# Patient Record
Sex: FEMALE | Race: WHITE | NOT HISPANIC OR LATINO | Employment: OTHER | URBAN - METROPOLITAN AREA
[De-identification: names, ages, dates, MRNs, and addresses within clinical notes are randomized per-mention and may not be internally consistent; named-entity substitution may affect disease eponyms.]

---

## 2022-11-11 ENCOUNTER — TRANSFERRED RECORDS (OUTPATIENT)
Dept: HEALTH INFORMATION MANAGEMENT | Facility: CLINIC | Age: 32
End: 2022-11-11

## 2023-03-31 ENCOUNTER — TRANSFERRED RECORDS (OUTPATIENT)
Dept: HEALTH INFORMATION MANAGEMENT | Facility: CLINIC | Age: 33
End: 2023-03-31

## 2023-06-21 PROBLEM — F90.9 ADHD (ATTENTION DEFICIT HYPERACTIVITY DISORDER): Status: ACTIVE | Noted: 2023-06-21

## 2023-06-21 PROBLEM — E06.3 HASHIMOTO'S DISEASE: Status: ACTIVE | Noted: 2023-06-21

## 2023-06-21 RX ORDER — LEVOTHYROXINE SODIUM 125 UG/1
125 TABLET ORAL DAILY
COMMUNITY
End: 2024-03-26

## 2023-06-21 NOTE — PATIENT INSTRUCTIONS
Send a [email or web address] message with your tetanus booster dates and hepatitis B vaccine.  Schedule lab only visit for blood work    Dr. Brush    Adult Attention Deficit Hyperactivity Disorder    [facility]  [address] , [address], [place], [place], [address]  [phone]  [email or web address]    [facility]  Several Locations  [phone])  [email or web address]    [facility]  [address]  [address]  [place] [address]  [phone]  [email or web address]    [facility]  Several Locations.  [phone]  [email or web address]    [facility]  [facility]  [address]  [place], [place] [address] [phone]  [email or web address]              Preventive Health Recommendations  Female Ages 26 - 39  Yearly exam:   See your health care provider every year in order to  Review health changes.   Discuss preventive care.    Review your medicines if you your doctor has prescribed any.    Until age 30: Get a Pap test every three years (more often if you have had an abnormal result).    After age 30: Talk to your doctor about whether you should have a Pap test every 3 years or have a Pap test with HPV screening every 5 years.   You do not need a Pap test if your uterus was removed (hysterectomy) and you have not had cancer.  You should be tested each year for STDs (sexually transmitted diseases), if you're at risk.   Talk to your provider about how often to have your cholesterol checked.  If you are at risk for diabetes, you should have a diabetes test (fasting glucose).  Shots: Get a flu shot each year. Get a tetanus shot every 10 years.   Nutrition:   Eat at least 5 servings of fruits and vegetables each day.  Eat whole-grain bread, whole-wheat pasta and brown rice instead of white grains and  rice.  Get adequate Calcium and Vitamin D.     Lifestyle  Exercise at least 150 minutes a week (30 minutes a day, 5 days of the week). This will help you control your weight and prevent disease.  Limit alcohol to one drink per day.  No smoking.   Wear sunscreen to prevent skin cancer.  See your dentist every six months for an exam and cleaning.

## 2023-06-28 ASSESSMENT — ENCOUNTER SYMPTOMS
ARTHRALGIAS: 1
FEVER: 0
SHORTNESS OF BREATH: 0
HEMATURIA: 0
ABDOMINAL PAIN: 0
WEAKNESS: 0
NERVOUS/ANXIOUS: 1
JOINT SWELLING: 1
SORE THROAT: 0
BREAST MASS: 0
FREQUENCY: 0
EYE PAIN: 0
COUGH: 0
NAUSEA: 0
DYSURIA: 0
DIARRHEA: 0
HEMATOCHEZIA: 0
CHILLS: 1
HEARTBURN: 0
MYALGIAS: 0
PALPITATIONS: 0
DIZZINESS: 0
HEADACHES: 0
PARESTHESIAS: 0
CONSTIPATION: 1

## 2023-07-03 ENCOUNTER — OFFICE VISIT (OUTPATIENT)
Dept: FAMILY MEDICINE | Facility: CLINIC | Age: 33
End: 2023-07-03
Payer: COMMERCIAL

## 2023-07-03 VITALS
HEIGHT: 65 IN | BODY MASS INDEX: 23.16 KG/M2 | DIASTOLIC BLOOD PRESSURE: 63 MMHG | RESPIRATION RATE: 15 BRPM | SYSTOLIC BLOOD PRESSURE: 93 MMHG | TEMPERATURE: 98.8 F | WEIGHT: 139 LBS | HEART RATE: 64 BPM | OXYGEN SATURATION: 97 %

## 2023-07-03 DIAGNOSIS — Z00.00 ROUTINE GENERAL MEDICAL EXAMINATION AT A HEALTH CARE FACILITY: Primary | ICD-10-CM

## 2023-07-03 DIAGNOSIS — Z13.220 SCREENING FOR LIPID DISORDERS: ICD-10-CM

## 2023-07-03 DIAGNOSIS — Z11.4 SCREENING FOR HIV (HUMAN IMMUNODEFICIENCY VIRUS): ICD-10-CM

## 2023-07-03 DIAGNOSIS — F90.9 ATTENTION DEFICIT HYPERACTIVITY DISORDER (ADHD), UNSPECIFIED ADHD TYPE: ICD-10-CM

## 2023-07-03 DIAGNOSIS — E06.3 HASHIMOTO'S DISEASE: ICD-10-CM

## 2023-07-03 DIAGNOSIS — Z11.59 NEED FOR HEPATITIS C SCREENING TEST: ICD-10-CM

## 2023-07-03 DIAGNOSIS — J30.81 CAT ALLERGIES: ICD-10-CM

## 2023-07-03 PROBLEM — M23.92 ARTICULAR CARTILAGE DISORDER OF LEFT KNEE: Status: ACTIVE | Noted: 2022-12-15

## 2023-07-03 PROCEDURE — 99385 PREV VISIT NEW AGE 18-39: CPT | Performed by: STUDENT IN AN ORGANIZED HEALTH CARE EDUCATION/TRAINING PROGRAM

## 2023-07-03 PROCEDURE — 99213 OFFICE O/P EST LOW 20 MIN: CPT | Mod: 25 | Performed by: STUDENT IN AN ORGANIZED HEALTH CARE EDUCATION/TRAINING PROGRAM

## 2023-07-03 ASSESSMENT — ENCOUNTER SYMPTOMS
BREAST MASS: 0
COUGH: 0
DYSURIA: 0
CHILLS: 1
WEAKNESS: 0
DIARRHEA: 0
FEVER: 0
ARTHRALGIAS: 1
NERVOUS/ANXIOUS: 1
SORE THROAT: 0
FREQUENCY: 0
HEARTBURN: 0
EYE PAIN: 0
DIZZINESS: 0
HEADACHES: 0
HEMATURIA: 0
MYALGIAS: 0
SHORTNESS OF BREATH: 0
CONSTIPATION: 1
PARESTHESIAS: 0
PALPITATIONS: 0
ABDOMINAL PAIN: 0
NAUSEA: 0
HEMATOCHEZIA: 0
JOINT SWELLING: 1

## 2023-07-03 ASSESSMENT — PAIN SCALES - GENERAL: PAINLEVEL: NO PAIN (0)

## 2023-07-03 NOTE — PROGRESS NOTES
SUBJECTIVE:   CC: Thelma is an 32 year old who presents for preventive health visit.       7/3/2023     2:58 PM   Additional Questions   Roomed by Brenda Moreno     Healthy Habits:     Getting at least 3 servings of Calcium per day:  Yes    Bi-annual eye exam:  NO    Dental care twice a year:  Yes    Sleep apnea or symptoms of sleep apnea:  Daytime drowsiness    Diet:  Gluten-free/reduced    Frequency of exercise:  4-5 days/week    Duration of exercise:  15-30 minutes    Taking medications regularly:  Yes    Medication side effects:  None    Additional concerns today:  Yes    Work-dietician  Diet-gluten free  Execise-limited 2/2 knee surgery, strength, walking  Fam hx ovarian, breast, colon ca    LMP:no concerns, light-med  Contraception: none  Pap/hpv-due 2025    Hx PCOS  Hashimotos  -on synthroid  -will refill once labs back    adhd  -previously seeing therapist  -had discussed stimulant vs zoloft in past  -introvert due to lotsof stimulation  -perfectionism, anxiety, no panic ac    TDAP up to date  Unsure about hep B    Today's PHQ-2 Score:       7/3/2023     2:37 PM   PHQ-2 ( 1999 [employer])   Q1: Little interest or pleasure in doing things 0   Q2: Feeling down, depressed or hopeless 0   PHQ-2 Score 0   Q1: Little interest or pleasure in doing things Not at all   Q2: Feeling down, depressed or hopeless Not at all   PHQ-2 Score 0     Have you ever done Advance Care Planning? (For example, a Health Directive, POLST, or a discussion with a medical provider or your loved ones about your wishes): No, advance care planning information given to patient to review.  Patient declined advance care planning discussion at this time.    Social History     Tobacco Use     Smoking status: Never     Smokeless tobacco: Never   Substance Use Topics     Alcohol use: Yes     Comment: once a week socially, if that           6/28/2023    11:37 AM   Alcohol Use   Prescreen: >3 drinks/day or >7 drinks/week? No          No data to display               Reviewed orders with patient.  Reviewed health maintenance and updated orders accordingly - Yes      Breast Cancer Screening:    FHS-7:       6/28/2023    11:44 AM   Breast CA Risk Assessment (FHS-7)   Did any of your first-degree relatives have breast or ovarian cancer? No   Did any of your relatives have bilateral breast cancer? Unknown   Did any man in your family have breast cancer? Unknown   Did any woman in your family have breast and ovarian cancer? Yes   Did any woman in your family have breast cancer before age 50 y? No   Do you have 2 or more relatives with breast and/or ovarian cancer? Unknown   Do you have 2 or more relatives with breast and/or bowel cancer? No       Pertinent mammograms are reviewed under the imaging tab.    History of abnormal Pap smear: NO - age 30- 65 PAP every 3 years recommended     Reviewed and updated as needed this visit by clinical staff   Tobacco  Allergies  Meds              Reviewed and updated as needed this visit by Provider                 Past Medical History:   Diagnosis Date     Arthritis 2022    osteoarthritis knee     Thyroid disease 2012; 2016    hypothyroidism; Hashimoto's      Past Surgical History:   Procedure Laterality Date     ARTHROSCOPY KNEE  07/2019    acl, meniscus     OB History   No obstetric history on file.       Review of Systems   Constitutional: Positive for chills. Negative for fever.   HENT: Positive for congestion. Negative for ear pain, hearing loss and sore throat.    Eyes: Negative for pain and visual disturbance.   Respiratory: Negative for cough and shortness of breath.    Cardiovascular: Negative for chest pain, palpitations and peripheral edema.   Gastrointestinal: Positive for constipation. Negative for abdominal pain, diarrhea, heartburn, hematochezia and nausea.   Breasts:  Positive for discharge. Negative for tenderness and breast mass.   Genitourinary: Positive for vaginal discharge. Negative for dysuria, frequency,  "genital sores, hematuria, pelvic pain, urgency and vaginal bleeding.   Musculoskeletal: Positive for arthralgias and joint swelling. Negative for myalgias.   Skin: Negative for rash.   Neurological: Negative for dizziness, weakness, headaches and paresthesias.   Psychiatric/Behavioral: Negative for mood changes. The patient is nervous/anxious.         OBJECTIVE:   BP 93/63 (BP Location: Right arm, Patient Position: Sitting, Cuff Size: Adult Regular)   Pulse 64   Temp 98.8  F (37.1  C) (Temporal)   Resp 15   Ht 1.651 m (5' 5\")   Wt 63 kg (139 lb)   LMP 06/12/2023 (Exact Date)   SpO2 97%   BMI 23.13 kg/m       Physical Exam  Constitutional:       General: She is not in acute distress.     Appearance: Normal appearance. She is well-developed. She is not ill-appearing.   HENT:      Head: Normocephalic and atraumatic.      Right Ear: Tympanic membrane, ear canal and external ear normal.      Left Ear: Tympanic membrane, ear canal and external ear normal.      Nose: Nose normal.      Mouth/Throat:      Pharynx: No oropharyngeal exudate.   Eyes:      Conjunctiva/sclera: Conjunctivae normal.      Pupils: Pupils are equal, round, and reactive to light.   Cardiovascular:      Rate and Rhythm: Normal rate and regular rhythm.      Heart sounds: Normal heart sounds. No murmur heard.  Pulmonary:      Effort: Pulmonary effort is normal.      Breath sounds: No wheezing or rales.   Chest:   Breasts:     Right: Normal.      Left: Normal.   Abdominal:      General: Bowel sounds are normal.      Palpations: Abdomen is soft.      Tenderness: There is no abdominal tenderness.   Musculoskeletal:      Cervical back: Normal range of motion and neck supple. No rigidity.   Lymphadenopathy:      Cervical: No cervical adenopathy.      Upper Body:      Right upper body: No supraclavicular, axillary or pectoral adenopathy.      Left upper body: No supraclavicular, axillary or pectoral adenopathy.   Skin:     General: Skin is warm and dry. "      Findings: No rash.   Neurological:      General: No focal deficit present.      Mental Status: She is alert and oriented to person, place, and time.   Psychiatric:         Mood and Affect: Mood normal.         Behavior: Behavior normal.         ASSESSMENT/PLAN:   Thelma was seen today for physical.    Routine general medical examination at a health care facility  -Vitals WNL  -Pap: due 2025  -Immunizations: will send Tigerlily message with hep B, TDAP dates, declines covid vaccine  -Labs: cbc, cmp, hiv, hep c, tsh, lipids  -Exercise: strength training, walking  -Diet: well balanced, gluten free    Screening for HIV (human immunodeficiency virus)  -     HIV Antigen Antibody Combo; Future    Need for hepatitis C screening test  -     Hepatitis C Screen Reflex to HCV RNA Quant and Genotype; Future    Screening for lipid disorders  -     Lipid panel; Future    Hashimoto's disease  -     TSH with free T4 reflex; Future    Attention deficit hyperactivity disorder (ADHD), unspecified ADHD type  Never been formally diagnosed. Provider ADHD evaluation options in AVS. Briefly discussed medication options including stimulants, non-stimulants, wellbutrin, anti-depressants. Working with therapist-going well.    Cat allergies   Requesting allergy referral to test for cat allergy. Already using antihistamine and flonase.  -     Adult Allergy/Asthma Referral; Future      COUNSELING:  Reviewed preventive health counseling, as reflected in patient instructions        She reports that she has never smoked. She has never used smokeless tobacco.      Kwesi Brush DO  [facility]

## 2023-07-14 ENCOUNTER — LAB (OUTPATIENT)
Dept: LAB | Facility: CLINIC | Age: 33
End: 2023-07-14
Payer: COMMERCIAL

## 2023-07-14 DIAGNOSIS — Z11.4 SCREENING FOR HIV (HUMAN IMMUNODEFICIENCY VIRUS): ICD-10-CM

## 2023-07-14 DIAGNOSIS — E06.3 HASHIMOTO'S DISEASE: ICD-10-CM

## 2023-07-14 DIAGNOSIS — Z13.220 SCREENING FOR LIPID DISORDERS: ICD-10-CM

## 2023-07-14 DIAGNOSIS — Z00.00 ROUTINE GENERAL MEDICAL EXAMINATION AT A HEALTH CARE FACILITY: ICD-10-CM

## 2023-07-14 DIAGNOSIS — Z11.59 NEED FOR HEPATITIS C SCREENING TEST: ICD-10-CM

## 2023-07-14 LAB
ALBUMIN SERPL BCG-MCNC: 4.6 G/DL (ref 3.5–5.2)
ALP SERPL-CCNC: 43 U/L (ref 35–104)
ALT SERPL W P-5'-P-CCNC: 6 U/L (ref 0–50)
ANION GAP SERPL CALCULATED.3IONS-SCNC: 12 MMOL/L (ref 7–15)
AST SERPL W P-5'-P-CCNC: 27 U/L (ref 0–45)
BILIRUB SERPL-MCNC: 0.4 MG/DL
BUN SERPL-MCNC: 17.1 MG/DL (ref 6–20)
CALCIUM SERPL-MCNC: 9.4 MG/DL (ref 8.6–10)
CHLORIDE SERPL-SCNC: 102 MMOL/L (ref 98–107)
CHOLEST SERPL-MCNC: 195 MG/DL
CREAT SERPL-MCNC: 0.82 MG/DL (ref 0.51–0.95)
DEPRECATED HCO3 PLAS-SCNC: 24 MMOL/L (ref 22–29)
ERYTHROCYTE [DISTWIDTH] IN BLOOD BY AUTOMATED COUNT: 11.9 % (ref 10–15)
GFR SERPL CREATININE-BSD FRML MDRD: >90 ML/MIN/1.73M2
GLUCOSE SERPL-MCNC: 82 MG/DL (ref 70–99)
HCT VFR BLD AUTO: 37.9 % (ref 35–47)
HCV AB SERPL QL IA: NONREACTIVE
HDLC SERPL-MCNC: 56 MG/DL
HGB BLD-MCNC: 12.5 G/DL (ref 11.7–15.7)
HIV 1+2 AB+HIV1 P24 AG SERPL QL IA: NONREACTIVE
LDLC SERPL CALC-MCNC: 129 MG/DL
MCH RBC QN AUTO: 31.3 PG (ref 26.5–33)
MCHC RBC AUTO-ENTMCNC: 33 G/DL (ref 31.5–36.5)
MCV RBC AUTO: 95 FL (ref 78–100)
NONHDLC SERPL-MCNC: 139 MG/DL
PLATELET # BLD AUTO: 214 10E3/UL (ref 150–450)
POTASSIUM SERPL-SCNC: 4.3 MMOL/L (ref 3.4–5.3)
PROT SERPL-MCNC: 7.6 G/DL (ref 6.4–8.3)
RBC # BLD AUTO: 4 10E6/UL (ref 3.8–5.2)
SODIUM SERPL-SCNC: 138 MMOL/L (ref 136–145)
TRIGL SERPL-MCNC: 51 MG/DL
TSH SERPL DL<=0.005 MIU/L-ACNC: 3.04 UIU/ML (ref 0.3–4.2)
WBC # BLD AUTO: 4.7 10E3/UL (ref 4–11)

## 2023-07-14 PROCEDURE — 36415 COLL VENOUS BLD VENIPUNCTURE: CPT

## 2023-07-14 PROCEDURE — 84443 ASSAY THYROID STIM HORMONE: CPT

## 2023-07-14 PROCEDURE — 80061 LIPID PANEL: CPT

## 2023-07-14 PROCEDURE — 85027 COMPLETE CBC AUTOMATED: CPT

## 2023-07-14 PROCEDURE — 87389 HIV-1 AG W/HIV-1&-2 AB AG IA: CPT

## 2023-07-14 PROCEDURE — 80053 COMPREHEN METABOLIC PANEL: CPT

## 2023-07-14 PROCEDURE — 86803 HEPATITIS C AB TEST: CPT

## 2023-10-04 ENCOUNTER — MYC MEDICAL ADVICE (OUTPATIENT)
Dept: FAMILY MEDICINE | Facility: CLINIC | Age: 33
End: 2023-10-04
Payer: COMMERCIAL

## 2023-11-02 ENCOUNTER — OFFICE VISIT (OUTPATIENT)
Dept: ALLERGY | Facility: CLINIC | Age: 33
End: 2023-11-02
Attending: STUDENT IN AN ORGANIZED HEALTH CARE EDUCATION/TRAINING PROGRAM
Payer: COMMERCIAL

## 2023-11-02 VITALS
WEIGHT: 142.6 LBS | BODY MASS INDEX: 23.73 KG/M2 | DIASTOLIC BLOOD PRESSURE: 71 MMHG | HEART RATE: 66 BPM | OXYGEN SATURATION: 100 % | SYSTOLIC BLOOD PRESSURE: 112 MMHG

## 2023-11-02 DIAGNOSIS — R21 RASH: ICD-10-CM

## 2023-11-02 DIAGNOSIS — J30.81 CAT ALLERGIES: ICD-10-CM

## 2023-11-02 DIAGNOSIS — R06.7 SNEEZING: ICD-10-CM

## 2023-11-02 DIAGNOSIS — R09.89 RUNNY NOSE: Primary | ICD-10-CM

## 2023-11-02 PROCEDURE — 99203 OFFICE O/P NEW LOW 30 MIN: CPT | Performed by: INTERNAL MEDICINE

## 2023-11-02 RX ORDER — FLUTICASONE PROPIONATE 50 MCG
1 SPRAY, SUSPENSION (ML) NASAL DAILY
Qty: 16 G | Refills: 4 | Status: SHIPPED | OUTPATIENT
Start: 2023-11-02 | End: 2023-12-20

## 2023-11-02 RX ORDER — AZELASTINE HCL 205.5 UG/1
2 SPRAY NASAL DAILY
Qty: 30 ML | Refills: 4 | Status: SHIPPED | OUTPATIENT
Start: 2023-11-02 | End: 2023-12-20

## 2023-11-02 ASSESSMENT — ENCOUNTER SYMPTOMS
EYE REDNESS: 0
HEADACHES: 0
WHEEZING: 0
SINUS PRESSURE: 0
SHORTNESS OF BREATH: 0
EYE DISCHARGE: 1
ADENOPATHY: 0
JOINT SWELLING: 0
RHINORRHEA: 1
EYE ITCHING: 1
FEVER: 0
VOMITING: 0
ACTIVITY CHANGE: 0
CHEST TIGHTNESS: 0
CHILLS: 0
ARTHRALGIAS: 0
DIARRHEA: 0
NAUSEA: 0
FACIAL SWELLING: 0
COUGH: 0
MYALGIAS: 0

## 2023-11-02 NOTE — PROGRESS NOTES
Thelma Escobar was seen in the Allergy Clinic at [facility].    Thelma Escobar is a 32 year old female being seen today at the request of Dr. Brush in regards to environmental allergy concerns.    For the last 7 years she has had a cat.  She is starting to have increased symptoms over the last several years of itchy nose and throat, sneezing, rhinorrhea, eye itching, and congestion.  The primary symptoms are the sneezing and the rhinorrhea.  These symptoms are all year-round and seem to be worse in the spring.  She clears her throat frequently in the morning.  She has tried multiple antihistamines including Allegra, Zyrtec and Claritin.  The Allegra is what she is currently using and does seem to provide benefit.  She uses Flonase as needed but only once a week or less.  The Flonase is very effective.  She does not use eyedrops rarely in the spring.    She also notices when she consumes dairy in the winter only she will develop some small bumps on her hand that are red and mildly painful.  Cheese and yogurt is typically well-tolerated the rest of the year.    Past Medical History:   Diagnosis Date    Arthritis 2022    osteoarthritis knee    Thyroid disease 2012; 2016    hypothyroidism; Hashimoto's     Family History   Problem Relation Age of Onset    Hyperlipidemia Mother     Osteoporosis Mother     Thyroid Disease Father      Past Surgical History:   Procedure Laterality Date    ARTHROSCOPY KNEE  07/2019    acl, meniscus       ENVIRONMENTAL HISTORY:   Pets inside the house include 1 cat(s).  Do you smoke cigarettes or other recreational drugs? No There is/are 0 smokers living in the house. The house does not have a damp basement.     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      Review of Systems   Constitutional:  Negative for activity change, chills and fever.   HENT:  Positive for congestion, rhinorrhea and sneezing.  Negative for dental problem, ear pain, facial swelling, nosebleeds, postnasal drip and sinus pressure.    Eyes:  Positive for discharge and itching. Negative for redness.   Respiratory:  Negative for cough, chest tightness, shortness of breath and wheezing.    Cardiovascular:  Negative for chest pain.   Gastrointestinal:  Negative for diarrhea, nausea and vomiting.   Musculoskeletal:  Negative for arthralgias, joint swelling and myalgias.   Skin:  Negative for rash.   Neurological:  Negative for headaches.   Hematological:  Negative for adenopathy.   Psychiatric/Behavioral:  Negative for behavioral problems and self-injury.          Current Outpatient Medications:     azelastine (ASTEPRO) 0.15 % nasal spray, Spray 2 sprays into both nostrils daily, Disp: 30 mL, Rfl: 4    fluticasone (FLONASE) 50 MCG/ACT nasal spray, Spray 1 spray into both nostrils daily, Disp: 16 g, Rfl: 4    levothyroxine (SYNTHROID/LEVOTHROID) 125 MCG tablet, Take 125 mcg by mouth daily, Disp: , Rfl:   No Known Allergies      EXAM:   /71   Pulse 66   Wt 64.7 kg (142 lb 9.6 oz)   SpO2 100%   BMI 23.73 kg/m      Physical Exam    Constitutional:       General: She is not in acute distress.     Appearance: Normal appearance. She is not ill-appearing.   HENT:      Head: Normocephalic and atraumatic.      Nose: Nose normal. No congestion or rhinorrhea.      Mouth/Throat:      Mouth: Mucous membranes are moist.      Pharynx: Oropharynx is clear. No posterior oropharyngeal erythema.   Eyes:      General:         Right eye: No discharge.         Left eye: No discharge.   Cardiovascular:      Rate and Rhythm: Normal rate and regular rhythm.      Heart sounds: Normal heart sounds.   Pulmonary:      Effort: Pulmonary effort is normal.      Breath sounds: Normal breath sounds. No wheezing or rhonchi.   Skin:     General: Skin is warm.      Findings: No erythema or rash.   Neurological:      General: No focal deficit present.      Mental Status: She  is alert. Mental status is at baseline.   Psychiatric:         Mood and Affect: Mood normal.         Behavior: Behavior normal.        ASSESSMENT/PLAN:  Thelma Escobar is a 32 year old female seen today for environmental allergy concerns as well as dairy concerns when consumed in the winter causing a rash on her hands.    Allegra 180 mg once to twice daily  Flonase Sensimist 1 spray each nostril once to twice daily (may combine with Astepro).  Or could use Astepro by itself.  Zaditor or Pataday eyedrops as needed  Allergy shots are a consideration  D-hist  We will check blood test for environmental allergies.  No testing necessary for dairy since she can consume this most of the year.    Follow-up in 1 month      Thank you for allowing me to participate in the care of Thelma Escobar.      I spent 35 minutes on the date of the encounter doing chart review, history and exam, documentation and further coordination as noted above exclusive of separately reported interpretations    Mario Sauer MD  Allergy/Immunology  [facility]

## 2023-11-02 NOTE — PATIENT INSTRUCTIONS
Allegra 180 mg once to twice daily  Flonase Sensimist 1 spray each nostril once to twice daily (may combine with Astepro).  Or could use Astepro by itself.  Zaditor or Pataday eyedrops as needed  Allergy shots are a consideration  D-hist        Allergy Staff Appt Hours Shot Hours Location       Physician   Mario Sauer MD      Support Staff   RAYMOND Casas, RN   Michel DENISE MA         Mondays Tuesdays Thursdays and Fridays:      [place] 7-5 Wednesdays         Close                Mondays, Tuesdays and Fridays:  7:20 - 3:40              [facility]  [address]  [place] [address]  Allergy appointment  line: [phone]    Pulmonary Function Scheduling:  [place]: [phone]

## 2023-11-02 NOTE — LETTER
11/2/2023         RE: Thelma Escobar  [address]  [facility] [address]        Dear Colleague,    Thank you for referring your patient, Thelma Escobar, to the [facility] [place]. Please see a copy of my visit note below.    Thelma Escobar was seen in the Allergy Clinic at [facility].    Thelma Escobar is a 32 year old female being seen today at the request of Dr. Brush in regards to environmental allergy concerns.    For the last 7 years she has had a cat.  She is starting to have increased symptoms over the last several years of itchy nose and throat, sneezing, rhinorrhea, eye itching, and congestion.  The primary symptoms are the sneezing and the rhinorrhea.  These symptoms are all year-round and seem to be worse in the spring.  She clears her throat frequently in the morning.  She has tried multiple antihistamines including Allegra, Zyrtec and Claritin.  The Allegra is what she is currently using and does seem to provide benefit.  She uses Flonase as needed but only once a week or less.  The Flonase is very effective.  She does not use eyedrops rarely in the spring.    She also notices when she consumes dairy in the winter only she will develop some small bumps on her hand that are red and mildly painful.  Cheese and yogurt is typically well-tolerated the rest of the year.    Past Medical History:   Diagnosis Date     Arthritis 2022    osteoarthritis knee     Thyroid disease 2012; 2016    hypothyroidism; Hashimoto's     Family History   Problem Relation Age of Onset     Hyperlipidemia Mother      Osteoporosis Mother      Thyroid Disease Father      Past Surgical History:   Procedure Laterality Date     ARTHROSCOPY KNEE  07/2019    acl, meniscus       ENVIRONMENTAL HISTORY:   Pets inside the house include 1 cat(s).  Do you smoke cigarettes or other recreational drugs? No There is/are 0 smokers living in the house. The house does not have a  damp basement.     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      Review of Systems   Constitutional:  Negative for activity change, chills and fever.   HENT:  Positive for congestion, rhinorrhea and sneezing. Negative for dental problem, ear pain, facial swelling, nosebleeds, postnasal drip and sinus pressure.    Eyes:  Positive for discharge and itching. Negative for redness.   Respiratory:  Negative for cough, chest tightness, shortness of breath and wheezing.    Cardiovascular:  Negative for chest pain.   Gastrointestinal:  Negative for diarrhea, nausea and vomiting.   Musculoskeletal:  Negative for arthralgias, joint swelling and myalgias.   Skin:  Negative for rash.   Neurological:  Negative for headaches.   Hematological:  Negative for adenopathy.   Psychiatric/Behavioral:  Negative for behavioral problems and self-injury.          Current Outpatient Medications:      azelastine (ASTEPRO) 0.15 % nasal spray, Spray 2 sprays into both nostrils daily, Disp: 30 mL, Rfl: 4     fluticasone (FLONASE) 50 MCG/ACT nasal spray, Spray 1 spray into both nostrils daily, Disp: 16 g, Rfl: 4     levothyroxine (SYNTHROID/LEVOTHROID) 125 MCG tablet, Take 125 mcg by mouth daily, Disp: , Rfl:   No Known Allergies      EXAM:   /71   Pulse 66   Wt 64.7 kg (142 lb 9.6 oz)   SpO2 100%   BMI 23.73 kg/m      Physical Exam    Constitutional:       General: She is not in acute distress.     Appearance: Normal appearance. She is not ill-appearing.   HENT:      Head: Normocephalic and atraumatic.      Nose: Nose normal. No congestion or rhinorrhea.      Mouth/Throat:      Mouth: Mucous membranes are moist.      Pharynx: Oropharynx is clear. No posterior oropharyngeal erythema.   Eyes:      General:         Right eye: No discharge.         Left eye: No discharge.   Cardiovascular:      Rate and Rhythm: Normal rate and regular rhythm.      Heart sounds: Normal heart sounds.    Pulmonary:      Effort: Pulmonary effort is normal.      Breath sounds: Normal breath sounds. No wheezing or rhonchi.   Skin:     General: Skin is warm.      Findings: No erythema or rash.   Neurological:      General: No focal deficit present.      Mental Status: She is alert. Mental status is at baseline.   Psychiatric:         Mood and Affect: Mood normal.         Behavior: Behavior normal.        ASSESSMENT/PLAN:  Thelma Escobar is a 32 year old female seen today for environmental allergy concerns as well as dairy concerns when consumed in the winter causing a rash on her hands.    Allegra 180 mg once to twice daily  Flonase Sensimist 1 spray each nostril once to twice daily (may combine with Astepro).  Or could use Astepro by itself.  Zaditor or Pataday eyedrops as needed  Allergy shots are a consideration  D-hist  We will check blood test for environmental allergies.  No testing necessary for dairy since she can consume this most of the year.    Follow-up in 1 month      Thank you for allowing me to participate in the care of Thelma Escobar.      I spent 35 minutes on the date of the encounter doing chart review, history and exam, documentation and further coordination as noted above exclusive of separately reported interpretations    Mario Sauer MD  Allergy/Immunology  [facility]      Again, thank you for allowing me to participate in the care of your patient.        Sincerely,        Mario Sauer MD

## 2023-11-17 ENCOUNTER — LAB (OUTPATIENT)
Dept: LAB | Facility: CLINIC | Age: 33
End: 2023-11-17
Payer: COMMERCIAL

## 2023-11-17 DIAGNOSIS — R06.7 SNEEZING: ICD-10-CM

## 2023-11-17 DIAGNOSIS — R09.89 RUNNY NOSE: ICD-10-CM

## 2023-11-17 PROCEDURE — 36415 COLL VENOUS BLD VENIPUNCTURE: CPT

## 2023-11-17 PROCEDURE — 86003 ALLG SPEC IGE CRUDE XTRC EA: CPT

## 2023-11-22 LAB
A ALTERNATA IGE QN: <0.1 KU(A)/L
A FUMIGATUS IGE QN: <0.1 KU(A)/L
C HERBARUM IGE QN: <0.1 KU(A)/L
CALIF WALNUT POLN IGE QN: 0.24 KU(A)/L
CAT DANDER IGG QN: 4.98 KU(A)/L
CEDAR IGE QN: <0.1 KU(A)/L
COMMON RAGWEED IGE QN: 1.15 KU(A)/L
COTTONWOOD IGE QN: 0.24 KU(A)/L
D FARINAE IGE QN: 8.97 KU(A)/L
D PTERONYSS IGE QN: 7.95 KU(A)/L
DOG DANDER+EPITH IGE QN: 0.4 KU(A)/L
E PURPURASCENS IGE QN: <0.1 KU(A)/L
EAST WHITE PINE IGE QN: 0.19 KU(A)/L
ENGL PLANTAIN IGE QN: 0.2 KU(A)/L
FIREBUSH IGE QN: 0.21 KU(A)/L
GIANT RAGWEED IGE QN: 0.32 KU(A)/L
GOOSEFOOT IGE QN: 0.23 KU(A)/L
JOHNSON GRASS IGE QN: 3.26 KU(A)/L
MAPLE IGE QN: 0.25 KU(A)/L
MUGWORT IGE QN: 0.16 KU(A)/L
NETTLE IGE QN: 0.27 KU(A)/L
P NOTATUM IGE QN: <0.1 KU(A)/L
RED MULBERRY IGE QN: <0.1 KU(A)/L
SALTWORT IGE QN: 0.25 KU(A)/L
SHEEP SORREL IGE QN: 0.24 KU(A)/L
SILVER BIRCH IGE QN: <0.1 KU(A)/L
TIMOTHY IGE QN: 6.13 KU(A)/L
WHITE ASH IGE QN: 0.28 KU(A)/L
WHITE ELM IGE QN: 0.27 KU(A)/L
WHITE MULBERRY IGE QN: <0.1 KU(A)/L
WHITE OAK IGE QN: 0.3 KU(A)/L
WORMWOOD IGE QN: 0.21 KU(A)/L

## 2023-12-08 ENCOUNTER — VIRTUAL VISIT (OUTPATIENT)
Dept: ALLERGY | Facility: CLINIC | Age: 33
End: 2023-12-08
Attending: INTERNAL MEDICINE
Payer: COMMERCIAL

## 2023-12-08 DIAGNOSIS — R06.7 SNEEZING: ICD-10-CM

## 2023-12-08 DIAGNOSIS — R09.89 RUNNY NOSE: ICD-10-CM

## 2023-12-08 PROCEDURE — 99213 OFFICE O/P EST LOW 20 MIN: CPT | Mod: VID | Performed by: INTERNAL MEDICINE

## 2023-12-08 NOTE — LETTER
12/8/2023         RE: Thelma Escobar  [address]  [facility] [address]        Dear Colleague,    Thank you for referring your patient, Thelma Escobar, to the [facility] [place]. Please see a copy of my visit note below.    Virtual Visit Details    Type of service:  Video Visit   Video Start Time:  11:42 AM  Video End Time: 11:53 AM    Originating Location (pt. Location): Home  Distant Location (provider location):  On-site  Platform used for Video Visit: [facility]       Thelma Escobar was seen virtually.    Thelma Escobar is a 33 year old female being seen today for ongoing evaluation of allergic rhinitis.  Blood testing since last seen was positive to numerous allergens including dust mite, grass, trees, weeds, dog and cat.  She does have a cat.    Since the last visit the patient has been having rhinorrhea, sneezing, watery and itchy eyes.  She does get benefit with the Allegra.  She will take this 1-2 times a day.  This gets about 80% improvement when she is taking the Allegra on a regular basis.  She did not want to start nasal steroids.  She did not try the Zaditor or Pataday eyedrops.  She did try the supplement D-Hist, however she did not find it all that effective.  She is a nutritionist.    She has purchased 2 air purifier's and is planning to get the carpet replaced in her rental.  She also has purchased covers for her mattress and pillows.  She is not interested in allergy shots.      Past Medical History:   Diagnosis Date     Arthritis 2022    osteoarthritis knee     Thyroid disease 2012; 2016    hypothyroidism; Hashimoto's     Family History   Problem Relation Age of Onset     Hyperlipidemia Mother      Osteoporosis Mother      Thyroid Disease Father      Past Surgical History:   Procedure Laterality Date     ARTHROSCOPY KNEE  07/2019    acl, meniscus         Current Outpatient Medications:      levothyroxine (SYNTHROID/LEVOTHROID) 125 MCG tablet, Take  125 mcg by mouth daily, Disp: , Rfl:      azelastine (ASTEPRO) 0.15 % nasal spray, Spray 2 sprays into both nostrils daily (Patient not taking: Reported on 12/8/2023), Disp: 30 mL, Rfl: 4     fluticasone (FLONASE) 50 MCG/ACT nasal spray, Spray 1 spray into both nostrils daily (Patient not taking: Reported on 12/8/2023), Disp: 16 g, Rfl: 4  No Known Allergies      EXAM:   There were no vitals taken for this visit.    Constitutional:       General: She is not in acute distress.     Appearance: Normal appearance. She is not ill-appearing.   HENT:      Head: Normocephalic and atraumatic.   Eyes:      General:         Right eye: No discharge.         Left eye: No discharge.   Neurological:      General: No focal deficit present.      Mental Status: She is alert. Mental status is at baseline.   Psychiatric:         Mood and Affect: Mood normal.         Behavior: Behavior normal.      WORKUP:    Latest Reference Range & Units 11/17/23 16:22   Allergen A alternata <0.10 KU(A)/L <0.10   Allergen A fumigatus <0.10 KU(A)/L <0.10   Allergen C herbarum <0.10 KU(A)/L <0.10   Allergen Cat Dander <0.10 KU(A)/L 4.98 (H)   Allergen Cedar IgE <0.10 KU(A)/L <0.10   Allergen D farinae <0.10 KU(A)/L 8.97 (H)   Allergen, D Pteronyssinus <0.10 KU(A)/L 7.95 (H)   Allergen Dog Dander <0.10 KU(A)/L 0.40 (H)   Allergen Elm <0.10 KU(A)/L 0.27 (H)   Allergen Epicoccum purpurascens IgE <0.10 KU(A)/L <0.10   Allergen, Kochia/Firebush <0.10 KU(A)/L 0.21 (H)   Allergen Maple <0.10 KU(A)/L 0.25 (H)   Allergen Mugwort IgE <0.10 KU(A)/L 0.16 (H)   Allergen Oak(white) <0.10 KU(A)/L 0.30 (H)   Allergen P notatum <0.10 KU(A)/L <0.10   Allergen Red [place] IgE <0.10 KU(A)/L <0.10   Allergen Sagebrush [place] IgE <0.10 KU(A)/L 0.21 (H)   Allergen Jarrod <0.10 KU(A)/L 6.13 (H)   Allergen Tree White [place] IgE <0.10 KU(A)/L <0.10   Allergen [place] Tree <0.10 KU(A)/L 0.24 (H)   Allergen Weed Nettle IgE <0.10 KU(A)/L 0.27 (H)   Allergen [place] <0.10  KU(A)/L 0.19 (H)   Allergen [place] <0.10 KU(A)/L 0.24 (H)   Allergen, English Plantain <0.10 KU(A)/L 0.20 (H)   Allergen, Giant Ragweed <0.10 KU(A)/L 0.32 (H)   Allergen Jesse Grass <0.10 KU(A)/L 3.26 (H)   Allergen, Lamb's Quarters <0.10 KU(A)/L 0.23 (H)   Allergen, Ragweed Short <0.10 KU(A)/L 1.15 (H)   Allergen Russian Thistle <0.10 KU(A)/L 0.25 (H)   Allergen Sheep Sorrel IgE <0.10 KU(A)/L 0.24 (H)   Allergen, Silver Birch <0.10 KU(A)/L <0.10   Allergen White [place] <0.10 KU(A)/L 0.28 (H)   (H): Data is abnormally high    ASSESSMENT/PLAN:  Thelma Escobar is a 33 year old female seen today for ongoing evaluation of allergic rhinitis.  Blood test results were reviewed with her today.    Continue with the Allegra 180 mg once to twice daily.  I did suggest trying Zaditor or Pataday as these are antihistamines and not steroids as these eyedrops would be effective when she does have increased eye itching.  Continue the use of the air purifier's and environmental control measures.  Humidity control was also discussed.  She is not interested in allergy immunotherapy at this time.  She does not want to try nasal steroids but we did discuss Astepro as this is a nasal antihistamine which she would prefer.  Will recommend follow-up as needed.      Thank you for allowing me to participate in the care of Thelma Escobar.      I spent 25 minutes on the date of the encounter doing chart review, history and exam, documentation and further coordination as noted above exclusive of separately reported interpretations    Mario Sauer MD  Allergy/Immunology  [facility]      Again, thank you for allowing me to participate in the care of your patient.        Sincerely,        Mario Sauer MD

## 2023-12-08 NOTE — PATIENT INSTRUCTIONS
Continue with the Allegra 180 mg once to twice daily.  I did suggest trying Zaditor or Pataday as these are antihistamines and not steroids as these eyedrops would be effective when she does have increased eye itching.  Continue the use of the air purifier's and environmental control measures.  Humidity control was also discussed.  She is not interested in allergy immunotherapy at this time.  She does not want to try nasal steroids but we did discuss Astepro as this is a nasal antihistamine which she would prefer.  Will recommend follow-up as needed.          Allergy Staff Appt Hours Shot Hours Location       Physician   Mario Sauer MD      Support Staff   RAYMOND Casas, GLADYS Brennan MA      Mondays Tuesdays Thursdays and Fridays:      [place] 7-5 Wednesdays         Close                Mondays, Tuesdays and Fridays:  7:20 - 3:40              [facility]  [address]  [place] [address]  Allergy appointment  line: [phone]    Pulmonary Function Scheduling:  [place]: [phone]

## 2023-12-08 NOTE — PROGRESS NOTES
Virtual Visit Details    Type of service:  Video Visit   Video Start Time:  11:42 AM  Video End Time: 11:53 AM    Originating Location (pt. Location): Home  Distant Location (provider location):  On-site  Platform used for Video Visit: [employer]       Thelma Escobar was seen virtually.    Thelma Escobar is a 33 year old female being seen today for ongoing evaluation of allergic rhinitis.  Blood testing since last seen was positive to numerous allergens including dust mite, grass, trees, weeds, dog and cat.  She does have a cat.    Since the last visit the patient has been having rhinorrhea, sneezing, watery and itchy eyes.  She does get benefit with the Allegra.  She will take this 1-2 times a day.  This gets about 80% improvement when she is taking the Allegra on a regular basis.  She did not want to start nasal steroids.  She did not try the Zaditor or Pataday eyedrops.  She did try the supplement D-Hist, however she did not find it all that effective.  She is a nutritionist.    She has purchased 2 air purifier's and is planning to get the carpet replaced in her rental.  She also has purchased covers for her mattress and pillows.  She is not interested in allergy shots.      Past Medical History:   Diagnosis Date    Arthritis 2022    osteoarthritis knee    Thyroid disease 2012; 2016    hypothyroidism; Hashimoto's     Family History   Problem Relation Age of Onset    Hyperlipidemia Mother     Osteoporosis Mother     Thyroid Disease Father      Past Surgical History:   Procedure Laterality Date    ARTHROSCOPY KNEE  07/2019    acl, meniscus         Current Outpatient Medications:     levothyroxine (SYNTHROID/LEVOTHROID) 125 MCG tablet, Take 125 mcg by mouth daily, Disp: , Rfl:     azelastine (ASTEPRO) 0.15 % nasal spray, Spray 2 sprays into both nostrils daily (Patient not taking: Reported on 12/8/2023), Disp: 30 mL, Rfl: 4    fluticasone (FLONASE) 50 MCG/ACT nasal spray, Spray 1 spray into both nostrils daily  (Patient not taking: Reported on 12/8/2023), Disp: 16 g, Rfl: 4  No Known Allergies      EXAM:   There were no vitals taken for this visit.    Constitutional:       General: She is not in acute distress.     Appearance: Normal appearance. She is not ill-appearing.   HENT:      Head: Normocephalic and atraumatic.   Eyes:      General:         Right eye: No discharge.         Left eye: No discharge.   Neurological:      General: No focal deficit present.      Mental Status: She is alert. Mental status is at baseline.   Psychiatric:         Mood and Affect: Mood normal.         Behavior: Behavior normal.      WORKUP:    Latest Reference Range & Units 11/17/23 16:22   Allergen A alternata <0.10 KU(A)/L <0.10   Allergen A fumigatus <0.10 KU(A)/L <0.10   Allergen C herbarum <0.10 KU(A)/L <0.10   Allergen Cat Dander <0.10 KU(A)/L 4.98 (H)   Allergen Cedar IgE <0.10 KU(A)/L <0.10   Allergen D farinae <0.10 KU(A)/L 8.97 (H)   Allergen, D Pteronyssinus <0.10 KU(A)/L 7.95 (H)   Allergen Dog Dander <0.10 KU(A)/L 0.40 (H)   Allergen Elm <0.10 KU(A)/L 0.27 (H)   Allergen Epicoccum purpurascens IgE <0.10 KU(A)/L <0.10   Allergen, Kochia/Firebush <0.10 KU(A)/L 0.21 (H)   Allergen Maple <0.10 KU(A)/L 0.25 (H)   Allergen Mugwort IgE <0.10 KU(A)/L 0.16 (H)   Allergen Oak(white) <0.10 KU(A)/L 0.30 (H)   Allergen P notatum <0.10 KU(A)/L <0.10   Allergen Red [place] IgE <0.10 KU(A)/L <0.10   Allergen Sagebrush [place] IgE <0.10 KU(A)/L 0.21 (H)   Allergen Jarrod <0.10 KU(A)/L 6.13 (H)   Allergen Tree White [place] IgE <0.10 KU(A)/L <0.10   Allergen [place] Tree <0.10 KU(A)/L 0.24 (H)   Allergen Weed Nettle IgE <0.10 KU(A)/L 0.27 (H)   Allergen [place] <0.10 KU(A)/L 0.19 (H)   Allergen [place] <0.10 KU(A)/L 0.24 (H)   Allergen, English Plantain <0.10 KU(A)/L 0.20 (H)   Allergen, Giant Ragweed <0.10 KU(A)/L 0.32 (H)   Allergen Jesse Grass <0.10 KU(A)/L 3.26 (H)   Allergen, Lamb's Quarters <0.10 KU(A)/L 0.23 (H)   Allergen,  Ragweed Short <0.10 KU(A)/L 1.15 (H)   Allergen Russian Thistle <0.10 KU(A)/L 0.25 (H)   Allergen Sheep Sorrel IgE <0.10 KU(A)/L 0.24 (H)   Allergen, Silver Birch <0.10 KU(A)/L <0.10   Allergen White [place] <0.10 KU(A)/L 0.28 (H)   (H): Data is abnormally high    ASSESSMENT/PLAN:  Thelma Escobar is a 33 year old female seen today for ongoing evaluation of allergic rhinitis.  Blood test results were reviewed with her today.    Continue with the Allegra 180 mg once to twice daily.  I did suggest trying Zaditor or Pataday as these are antihistamines and not steroids as these eyedrops would be effective when she does have increased eye itching.  Continue the use of the air purifier's and environmental control measures.  Humidity control was also discussed.  She is not interested in allergy immunotherapy at this time.  She does not want to try nasal steroids but we did discuss Astepro as this is a nasal antihistamine which she would prefer.  Will recommend follow-up as needed.      Thank you for allowing me to participate in the care of Thelma Escobar.      I spent 25 minutes on the date of the encounter doing chart review, history and exam, documentation and further coordination as noted above exclusive of separately reported interpretations    Mario Sauer MD  Allergy/Immunology  [facility]

## 2023-12-20 ENCOUNTER — MYC MEDICAL ADVICE (OUTPATIENT)
Dept: FAMILY MEDICINE | Facility: CLINIC | Age: 33
End: 2023-12-20

## 2023-12-20 ENCOUNTER — VIRTUAL VISIT (OUTPATIENT)
Dept: FAMILY MEDICINE | Facility: CLINIC | Age: 33
End: 2023-12-20
Payer: COMMERCIAL

## 2023-12-20 DIAGNOSIS — R53.83 OTHER FATIGUE: ICD-10-CM

## 2023-12-20 DIAGNOSIS — Z13.1 SCREENING FOR DIABETES MELLITUS: ICD-10-CM

## 2023-12-20 DIAGNOSIS — E06.3 HASHIMOTO'S DISEASE: ICD-10-CM

## 2023-12-20 DIAGNOSIS — Z87.42 HISTORY OF PCOS: Primary | ICD-10-CM

## 2023-12-20 DIAGNOSIS — R42 LIGHTHEADED: ICD-10-CM

## 2023-12-20 DIAGNOSIS — Z13.21 ENCOUNTER FOR VITAMIN DEFICIENCY SCREENING: ICD-10-CM

## 2023-12-20 DIAGNOSIS — R79.0 LOW FERRITIN: ICD-10-CM

## 2023-12-20 PROCEDURE — 99214 OFFICE O/P EST MOD 30 MIN: CPT | Mod: VID | Performed by: STUDENT IN AN ORGANIZED HEALTH CARE EDUCATION/TRAINING PROGRAM

## 2023-12-20 RX ORDER — FEXOFENADINE HCL 180 MG/1
TABLET ORAL
COMMUNITY
Start: 2023-11-01

## 2023-12-20 NOTE — PATIENT INSTRUCTIONS
-Labs ordered. Schedule lab only visit.  -Check with insurance for the cost of these labs. These are the ones that have the best chance of being covered, however you may still get a bill from insurance. I would call insurance to double check first.  -See below for the list of labs and their associated diagnosis.      Dr. Brush    History of PCOS  - Testosterone Free and Total  - Prolactin  - Follicle stimulating hormone  - Estradiol  - 17 OH progesterone  - Progesterone  - Luteinizing Hormone    Hashimoto's disease  - TSH  - T3, total  - T3, Free  - T4, free    Other fatigue  Lightheaded  - Hemoglobin A1c  - Insulin level    Low ferritin  - Ferritin  - Iron and iron binding capacity  - CBC with platelets    Encounter for vitamin deficiency screening  - Vitamin D Deficiency

## 2023-12-20 NOTE — TELEPHONE ENCOUNTER
Dr. Brush: please see message; can you specify which labs she should have done now and which should wait of the 17 pended?      Thank you for your time today! So I have hit my out of pocket this year due to a surgery and so I was hoping to do the labs before the end of the year. However, I was just doing the math with my cycle, and day 3 of my cycle won t happen until the beginning of January 2024.      With that being said, is it possible for me to do some of the labs before the end of the year, and then do the hormone ones (FSH, LH, progesterone, estradiol, 17-OH progesterone) sometime in the new year?    Hiral LÓPEZ RN  [facility]

## 2023-12-20 NOTE — PROGRESS NOTES
Thelma is a 33 year old who is being evaluated via a billable video visit.      How would you like to obtain your AVS? [email or web address]  If the video visit is dropped, the invitation should be resent by: Text to cell phone: [phone]  Will anyone else be joining your video visit? No    Assessment & Plan     Pt requesting multiple labs due to history of thyroid disease, PCOS. Explained how some requested tests are not medically indicated; some tests may not be covered. Elected to pursue the following labs noted below.    History of PCOS  Diagnosed based on elevated testosterone and symptoms. Prior workup not available in [place]. Having regular monthly periods. Will check testosterone, prolactin, FSH, LH, estradiol, 17 OH progesterone. Pt will come in on day 3 of her cycle.   - Testosterone Free and Total  - Prolactin  - Follicle stimulating hormone  - Estradiol  - 17 OH progesterone  - Progesterone  - Luteinizing Hormone    Hashimoto's disease  Will defer rechecking antibodies, as there is no clinical benefit to this. Will check full thyroid panel per pt preference.  - TSH  - T3, total  - T3, Free  - T4, free    Other fatigue  Lightheaded  Endorses intermittent fatigue, hypogylcemia symptoms (dizziness, shakiness, irritability when hungry). Will check A1C and insulin level. States she has had an elevated insulin level in the past.  - Hemoglobin A1c  - Insulin level    Low ferritin  Endorses history of low ferritin. Will recheck iron studies today.  - Ferritin  - Iron and iron binding capacity  - CBC with platelets    Encounter for vitamin deficiency screening  - Vitamin D Deficiency      Kwesi Brush, [facility]   Thelma is a 33 year old, presenting for the following health issues:  No chief complaint on file.    History of Present Illness       Hypothyroidism:     Since last visit, patient describes the following symptoms::  Constipation and Depression    Reason for  visit:  I'd like to discuss if it's possible to get labs done (covered through insurance)  to thoroughly check on PCOS, thyroid. Also starting to think about my fertility and would like to get some things checked for that.    She eats 4 or more servings of fruits and vegetables daily.She consumes 0 sweetened beverage(s) daily.She exercises with enough effort to increase her heart rate 30 to 60 minutes per day.  She exercises with enough effort to increase her heart rate 5 days per week.   She is taking medications regularly.     Interested in a bunch of labs due to PCOS and thyroid disease  Insulin, A1c, vit d, ferritin, iron, TIBC, cardiac CRP, homocysteine, prolactin, TSH, free and total T4 and T3, anti TPO, anti TTG, free and total testosterone, SHBG, AMH, FSH, LH, progesterone, estradiol  Hx low ferritin in the past  Hx high testosterone  Worried about fertility levels and nutrition  Works as dietician in women's health  Symptoms  Down/depressed  Weight gain  More significant changes in mood related to PMS  Menstrual cycle is regular, normal (light, shorter)  Brain fog, memory difficulties  Fatigue      Review of Systems   Constitutional, HEENT, cardiovascular, pulmonary, gi and gu systems are negative, except as otherwise noted.      Objective       Vitals:  No vitals were obtained today due to virtual visit.    Physical Exam   GENERAL: Healthy, alert and no distress  EYES: Eyes grossly normal to inspection.  No discharge or erythema, or obvious scleral/conjunctival abnormalities.  RESP: No audible wheeze, cough, or visible cyanosis.  No visible retractions or increased work of breathing.    SKIN: Visible skin clear. No significant rash, abnormal pigmentation or lesions.  NEURO: Cranial nerves grossly intact.  Mentation and speech appropriate for age.  PSYCH: Mentation appears normal, affect normal/bright, judgement and insight intact, normal speech and appearance well-groomed.          Video-Visit  Details    Type of service:  Video Visit     Originating Location (pt. Location): Home  Distant Location (provider location):  On-site  Platform used for Video Visit: [employer]

## 2023-12-21 NOTE — TELEPHONE ENCOUNTER
Ran sent to patient. I cancelled FSH, LH, estradiol, progesterone, 17-OH progesterone for now. Pt will reach out after the new year and I can then order these labs.    Dr. Brush

## 2023-12-28 ENCOUNTER — LAB (OUTPATIENT)
Dept: LAB | Facility: CLINIC | Age: 33
End: 2023-12-28
Payer: COMMERCIAL

## 2023-12-28 DIAGNOSIS — Z13.1 SCREENING FOR DIABETES MELLITUS: ICD-10-CM

## 2023-12-28 DIAGNOSIS — Z13.21 ENCOUNTER FOR VITAMIN DEFICIENCY SCREENING: ICD-10-CM

## 2023-12-28 DIAGNOSIS — R79.0 LOW FERRITIN: ICD-10-CM

## 2023-12-28 DIAGNOSIS — E06.3 HASHIMOTO'S DISEASE: ICD-10-CM

## 2023-12-28 DIAGNOSIS — R53.83 OTHER FATIGUE: ICD-10-CM

## 2023-12-28 DIAGNOSIS — R42 LIGHTHEADED: ICD-10-CM

## 2023-12-28 DIAGNOSIS — Z87.42 HISTORY OF PCOS: ICD-10-CM

## 2023-12-28 LAB
ERYTHROCYTE [DISTWIDTH] IN BLOOD BY AUTOMATED COUNT: 11.6 % (ref 10–15)
FERRITIN SERPL-MCNC: 72 NG/ML (ref 6–175)
HBA1C MFR BLD: 5.2 % (ref 0–5.6)
HCT VFR BLD AUTO: 37.2 % (ref 35–47)
HGB BLD-MCNC: 12.3 G/DL (ref 11.7–15.7)
INSULIN SERPL-ACNC: 7.3 UU/ML (ref 2.6–24.9)
IRON BINDING CAPACITY (ROCHE): 243 UG/DL (ref 240–430)
IRON SATN MFR SERPL: 49 % (ref 15–46)
IRON SERPL-MCNC: 120 UG/DL (ref 37–145)
MCH RBC QN AUTO: 30.8 PG (ref 26.5–33)
MCHC RBC AUTO-ENTMCNC: 33.1 G/DL (ref 31.5–36.5)
MCV RBC AUTO: 93 FL (ref 78–100)
PLATELET # BLD AUTO: 182 10E3/UL (ref 150–450)
PROLACTIN SERPL 3RD IS-MCNC: 37 NG/ML (ref 5–23)
RBC # BLD AUTO: 3.99 10E6/UL (ref 3.8–5.2)
SHBG SERPL-SCNC: 53 NMOL/L (ref 30–135)
T3 SERPL-MCNC: 81 NG/DL (ref 85–202)
T3FREE SERPL-MCNC: 2.6 PG/ML (ref 2–4.4)
T4 FREE SERPL-MCNC: 1.27 NG/DL (ref 0.9–1.7)
TSH SERPL DL<=0.005 MIU/L-ACNC: 1.95 UIU/ML (ref 0.3–4.2)
VIT D+METAB SERPL-MCNC: 36 NG/ML (ref 20–50)
WBC # BLD AUTO: 4.3 10E3/UL (ref 4–11)

## 2023-12-28 PROCEDURE — 84443 ASSAY THYROID STIM HORMONE: CPT

## 2023-12-28 PROCEDURE — 82306 VITAMIN D 25 HYDROXY: CPT

## 2023-12-28 PROCEDURE — 84146 ASSAY OF PROLACTIN: CPT

## 2023-12-28 PROCEDURE — 84439 ASSAY OF FREE THYROXINE: CPT

## 2023-12-28 PROCEDURE — 84481 FREE ASSAY (FT-3): CPT

## 2023-12-28 PROCEDURE — 36415 COLL VENOUS BLD VENIPUNCTURE: CPT

## 2023-12-28 PROCEDURE — 83036 HEMOGLOBIN GLYCOSYLATED A1C: CPT

## 2023-12-28 PROCEDURE — 84270 ASSAY OF SEX HORMONE GLOBUL: CPT

## 2023-12-28 PROCEDURE — 83550 IRON BINDING TEST: CPT

## 2023-12-28 PROCEDURE — 84403 ASSAY OF TOTAL TESTOSTERONE: CPT

## 2023-12-28 PROCEDURE — 83540 ASSAY OF IRON: CPT

## 2023-12-28 PROCEDURE — 83525 ASSAY OF INSULIN: CPT

## 2023-12-28 PROCEDURE — 82728 ASSAY OF FERRITIN: CPT

## 2023-12-28 PROCEDURE — 85027 COMPLETE CBC AUTOMATED: CPT

## 2024-01-01 LAB
TESTOST FREE SERPL-MCNC: 0.36 NG/DL
TESTOST SERPL-MCNC: 27 NG/DL (ref 8–60)

## 2024-03-26 ENCOUNTER — MYC REFILL (OUTPATIENT)
Dept: FAMILY MEDICINE | Facility: CLINIC | Age: 34
End: 2024-03-26
Payer: COMMERCIAL

## 2024-03-26 DIAGNOSIS — E06.3 HASHIMOTO'S DISEASE: Primary | ICD-10-CM

## 2024-03-27 RX ORDER — LEVOTHYROXINE SODIUM 125 UG/1
125 TABLET ORAL DAILY
Qty: 90 TABLET | Refills: 2 | Status: SHIPPED | OUTPATIENT
Start: 2024-03-27

## 2024-05-31 ENCOUNTER — MYC MEDICAL ADVICE (OUTPATIENT)
Dept: FAMILY MEDICINE | Facility: CLINIC | Age: 34
End: 2024-05-31
Payer: COMMERCIAL

## 2024-06-03 NOTE — TELEPHONE ENCOUNTER
Writer replied to patient via [facility].  ABIGAIL HoodN, RN (she/her)  [facility] RN

## 2024-10-06 ENCOUNTER — HEALTH MAINTENANCE LETTER (OUTPATIENT)
Age: 34
End: 2024-10-06

## 2024-10-27 SDOH — HEALTH STABILITY: PHYSICAL HEALTH: ON AVERAGE, HOW MANY MINUTES DO YOU ENGAGE IN EXERCISE AT THIS LEVEL?: 60 MIN

## 2024-10-27 SDOH — HEALTH STABILITY: PHYSICAL HEALTH: ON AVERAGE, HOW MANY DAYS PER WEEK DO YOU ENGAGE IN MODERATE TO STRENUOUS EXERCISE (LIKE A BRISK WALK)?: 6 DAYS

## 2024-10-27 ASSESSMENT — SOCIAL DETERMINANTS OF HEALTH (SDOH): HOW OFTEN DO YOU GET TOGETHER WITH FRIENDS OR RELATIVES?: TWICE A WEEK

## 2024-10-30 ENCOUNTER — OFFICE VISIT (OUTPATIENT)
Dept: FAMILY MEDICINE | Facility: CLINIC | Age: 34
End: 2024-10-30
Payer: COMMERCIAL

## 2024-10-30 VITALS
BODY MASS INDEX: 21.56 KG/M2 | RESPIRATION RATE: 19 BRPM | HEART RATE: 73 BPM | TEMPERATURE: 98.2 F | SYSTOLIC BLOOD PRESSURE: 102 MMHG | HEIGHT: 65 IN | DIASTOLIC BLOOD PRESSURE: 64 MMHG | OXYGEN SATURATION: 99 % | WEIGHT: 129.4 LBS

## 2024-10-30 DIAGNOSIS — Z13.220 SCREENING FOR LIPID DISORDERS: ICD-10-CM

## 2024-10-30 DIAGNOSIS — E06.3 HASHIMOTO'S DISEASE: ICD-10-CM

## 2024-10-30 DIAGNOSIS — Z11.3 SCREENING EXAMINATION FOR VENEREAL DISEASE: ICD-10-CM

## 2024-10-30 DIAGNOSIS — Z12.4 CERVICAL CANCER SCREENING: ICD-10-CM

## 2024-10-30 DIAGNOSIS — Z00.00 ROUTINE GENERAL MEDICAL EXAMINATION AT A HEALTH CARE FACILITY: Primary | ICD-10-CM

## 2024-10-30 LAB
CHOLEST SERPL-MCNC: 205 MG/DL
FASTING STATUS PATIENT QL REPORTED: NO
HDLC SERPL-MCNC: 67 MG/DL
LDLC SERPL CALC-MCNC: 125 MG/DL
NONHDLC SERPL-MCNC: 138 MG/DL
T PALLIDUM AB SER QL: NONREACTIVE
TRIGL SERPL-MCNC: 63 MG/DL
TSH SERPL DL<=0.005 MIU/L-ACNC: 2.59 UIU/ML (ref 0.3–4.2)

## 2024-10-30 PROCEDURE — 84443 ASSAY THYROID STIM HORMONE: CPT | Performed by: STUDENT IN AN ORGANIZED HEALTH CARE EDUCATION/TRAINING PROGRAM

## 2024-10-30 PROCEDURE — G0124 SCREEN C/V THIN LAYER BY MD: HCPCS | Performed by: PATHOLOGY

## 2024-10-30 PROCEDURE — 86780 TREPONEMA PALLIDUM: CPT | Performed by: STUDENT IN AN ORGANIZED HEALTH CARE EDUCATION/TRAINING PROGRAM

## 2024-10-30 PROCEDURE — 87624 HPV HI-RISK TYP POOLED RSLT: CPT | Performed by: STUDENT IN AN ORGANIZED HEALTH CARE EDUCATION/TRAINING PROGRAM

## 2024-10-30 PROCEDURE — 87491 CHLMYD TRACH DNA AMP PROBE: CPT | Performed by: STUDENT IN AN ORGANIZED HEALTH CARE EDUCATION/TRAINING PROGRAM

## 2024-10-30 PROCEDURE — 87591 N.GONORRHOEAE DNA AMP PROB: CPT | Performed by: STUDENT IN AN ORGANIZED HEALTH CARE EDUCATION/TRAINING PROGRAM

## 2024-10-30 PROCEDURE — 87389 HIV-1 AG W/HIV-1&-2 AB AG IA: CPT | Performed by: STUDENT IN AN ORGANIZED HEALTH CARE EDUCATION/TRAINING PROGRAM

## 2024-10-30 PROCEDURE — 99395 PREV VISIT EST AGE 18-39: CPT | Performed by: STUDENT IN AN ORGANIZED HEALTH CARE EDUCATION/TRAINING PROGRAM

## 2024-10-30 PROCEDURE — G0145 SCR C/V CYTO,THINLAYER,RESCR: HCPCS | Performed by: STUDENT IN AN ORGANIZED HEALTH CARE EDUCATION/TRAINING PROGRAM

## 2024-10-30 PROCEDURE — 80061 LIPID PANEL: CPT | Performed by: STUDENT IN AN ORGANIZED HEALTH CARE EDUCATION/TRAINING PROGRAM

## 2024-10-30 PROCEDURE — 36415 COLL VENOUS BLD VENIPUNCTURE: CPT | Performed by: STUDENT IN AN ORGANIZED HEALTH CARE EDUCATION/TRAINING PROGRAM

## 2024-10-30 RX ORDER — LEVOTHYROXINE SODIUM 125 UG/1
125 TABLET ORAL DAILY
Qty: 90 TABLET | Refills: 4 | Status: CANCELLED | OUTPATIENT
Start: 2024-10-30

## 2024-10-30 ASSESSMENT — PAIN SCALES - GENERAL: PAINLEVEL_OUTOF10: NO PAIN (0)

## 2024-10-30 NOTE — PROGRESS NOTES
Preventive Care Visit  [facility]  Kwesi Brush DO, Family Medicine  Oct 30, 2024      Assessment & Plan     Routine general medical examination at a health care facility  -Vitals: WNL  -Pap: due 3/2025, done today  -Immunizations: declines  -Labs: tsh, lipid, STI screening    Hashimoto's disease-will refill medication once lab results  - TSH with free T4 reflex    Cervical cancer screening  - HPV and Gynecologic Cytology Panel - Recommended Age 30 - 65 Years    Screening examination for venereal disease  - HIV Antigen Antibody Combo Cascade  - Treponema Abs w Reflex to RPR and Titer  - First-voided urine - Chlamydia trachomatis/Neisseria gonorrhoeae by PCR    Screening for lipid disorders  - Lipid panel      Counseling  Appropriate preventive services were addressed with this patient via screening, questionnaire, or discussion as appropriate for fall prevention, nutrition, physical activity, Tobacco-use cessation, social engagement, weight loss and cognition.  Checklist reviewing preventive services available has been given to the patient.  Reviewed patient's diet, addressing concerns and/or questions.   The patient was instructed to see the dentist every 6 months.       Carroll Renee is a 33 year old, presenting for the following:  Physical        10/30/2024     7:43 AM   Additional Questions   Roomed by Emely CARLIN    Pap/hpv-due 2025    Hashimotos  -synthroid 125mcg      Health Care Directive  Patient does not have a Health Care Directive: Discussed advance care planning with patient; information given to patient to review.      10/27/2024   General Health   How would you rate your overall physical health? Good   Feel stress (tense, anxious, or unable to sleep) Rather much      (!) STRESS CONCERN      10/27/2024   Nutrition   Three or more servings of calcium each day? Yes   Diet: Gluten-free/reduced   How many servings of fruit and vegetables per day? (!) 2-3    How many sweetened beverages each day? 0-1            10/27/2024   Exercise   Days per week of moderate/strenous exercise 6 days   Average minutes spent exercising at this level 60 min            10/27/2024   Social Factors   Frequency of gathering with friends or relatives Twice a week   Worry food won't last until get money to buy more No   Food not last or not have enough money for food? No   Do you have housing? (Housing is defined as stable permanent housing and does not include staying ouside in a car, in a tent, in an abandoned building, in an overnight shelter, or couch-surfing.) Yes   Are you worried about losing your housing? No   Lack of transportation? No   Unable to get utilities (heat,electricity)? No            10/27/2024   Dental   Dentist two times every year? (!) NO            10/27/2024   TB Screening   Were you born outside of the [place]? No        Today's PHQ-2 Score:       10/30/2024     7:38 AM   PHQ-2 ( 1999 [employer])   Q1: Little interest or pleasure in doing things 0    Q2: Feeling down, depressed or hopeless 0    PHQ-2 Score 0    Q1: Little interest or pleasure in doing things Not at all   Q2: Feeling down, depressed or hopeless Not at all   PHQ-2 Score 0       Patient-reported           10/27/2024   Substance Use   Alcohol more than 3/day or more than 7/wk No   Do you use any other substances recreationally? (!) CANNABIS PRODUCTS        Social History     Tobacco Use    Smoking status: Never    Smokeless tobacco: Never   Vaping Use    Vaping status: Never Used   Substance Use Topics    Alcohol use: Yes     Comment: once a week socially, if that    Drug use: Yes     Types: Marijuana     Comment: 2-3x/month           6/28/2023   LAST FHS-7 RESULTS   1st degree relative breast or ovarian cancer No    Any relative bilateral breast cancer Unknown    Any male have breast cancer Unknown    Any ONE woman have BOTH breast AND ovarian cancer Yes    Any woman with breast cancer before 50yrs No    2 or  "more relatives with breast AND/OR ovarian cancer Unknown    2 or more relatives with breast AND/OR bowel cancer No        Patient-reported             10/27/2024   STI Screening   New sexual partner(s) since last STI/HIV test? (!) YES         History of abnormal Pap smear: No - age 30-64 HPV with reflex Pap every 5 years recommended             10/27/2024   Contraception/Family Planning   Questions about contraception or family planning No        Reviewed and updated as needed this visit by Provider   Tobacco   Meds   Med Hx  Surg Hx  Fam Hx  Soc Hx Sexual Activity               Objective    Exam  /64 (BP Location: Right arm, Patient Position: Sitting, Cuff Size: Adult Regular)   Pulse 73   Temp 98.2  F (36.8  C) (Temporal)   Resp 19   Ht 1.645 m (5' 4.75\")   Wt 58.7 kg (129 lb 6.4 oz)   LMP 10/12/2024 (Approximate)   SpO2 99%   BMI 21.70 kg/m     Estimated body mass index is 21.7 kg/m  as calculated from the following:    Height as of this encounter: 1.645 m (5' 4.75\").    Weight as of this encounter: 58.7 kg (129 lb 6.4 oz).    Physical Exam  Constitutional:       General: She is not in acute distress.     Appearance: She is well-developed.   HENT:      Head: Normocephalic and atraumatic.      Right Ear: Tympanic membrane, ear canal and external ear normal.      Left Ear: Tympanic membrane, ear canal and external ear normal.      Nose: Nose normal.      Mouth/Throat:      Mouth: Mucous membranes are moist.      Pharynx: Oropharynx is clear. No oropharyngeal exudate.   Eyes:      Extraocular Movements: Extraocular movements intact.      Conjunctiva/sclera: Conjunctivae normal.      Pupils: Pupils are equal, round, and reactive to light.   Neck:      Thyroid: No thyroid mass, thyromegaly or thyroid tenderness.   Cardiovascular:      Rate and Rhythm: Normal rate and regular rhythm.      Heart sounds: Normal heart sounds. No murmur heard.  Pulmonary:      Effort: Pulmonary effort is normal.      " Breath sounds: No wheezing or rales.   Genitourinary:     General: Normal vulva.      Vagina: Vaginal discharge (white milky discharge) present.      Cervix: Normal.   Musculoskeletal:      Cervical back: Normal range of motion and neck supple. No rigidity or tenderness.   Lymphadenopathy:      Cervical: No cervical adenopathy.   Skin:     General: Skin is warm and dry.      Findings: No rash.   Neurological:      General: No focal deficit present.      Mental Status: She is alert and oriented to person, place, and time. Mental status is at baseline.   Psychiatric:         Mood and Affect: Mood normal.         Behavior: Behavior normal.         Thought Content: Thought content normal.               Signed Electronically by: Kwesi Brush DO

## 2024-10-30 NOTE — PATIENT INSTRUCTIONS
Patient Education   Preventive Care Advice   This is general advice given by our system to help you stay healthy. However, your care team may have specific advice just for you. Please talk to your care team about your preventive care needs.  Nutrition  Eat 5 or more servings of fruits and vegetables each day.  Try wheat bread, brown rice and whole grain pasta (instead of white bread, rice, and pasta).  Get enough calcium and vitamin D. Check the label on foods and aim for 100% of the RDA (recommended daily allowance).  Lifestyle  Exercise at least 150 minutes each week  (30 minutes a day, 5 days a week).  Do muscle strengthening activities 2 days a week. These help control your weight and prevent disease.  No smoking.  Wear sunscreen to prevent skin cancer.  Have a dental exam and cleaning every 6 months.  Yearly exams  See your health care team every year to talk about:  Any changes in your health.  Any medicines your care team has prescribed.  Preventive care, family planning, and ways to prevent chronic diseases.  Shots (vaccines)   HPV shots (up to age 26), if you've never had them before.  Hepatitis B shots (up to age 59), if you've never had them before.  COVID-19 shot: Get this shot when it's due.  Flu shot: Get a flu shot every year.  Tetanus shot: Get a tetanus shot every 10 years.  Pneumococcal, hepatitis A, and RSV shots: Ask your care team if you need these based on your risk.  Shingles shot (for age 50 and up)  General health tests  Diabetes screening:  Starting at age 35, Get screened for diabetes at least every 3 years.  If you are younger than age 35, ask your care team if you should be screened for diabetes.  Cholesterol test: At age 39, start having a cholesterol test every 5 years, or more often if advised.  Bone density scan (DEXA): At age 50, ask your care team if you should have this scan for osteoporosis (brittle bones).  Hepatitis C: Get tested at least once in your life.  STIs (sexually  transmitted infections)  Before age 24: Ask your care team if you should be screened for STIs.  After age 24: Get screened for STIs if you're at risk. You are at risk for STIs (including HIV) if:  You are sexually active with more than one person.  You don't use condoms every time.  You or a partner was diagnosed with a sexually transmitted infection.  If you are at risk for HIV, ask about PrEP medicine to prevent HIV.  Get tested for HIV at least once in your life, whether you are at risk for HIV or not.  Cancer screening tests  Cervical cancer screening: If you have a cervix, begin getting regular cervical cancer screening tests starting at age 21.  Breast cancer scan (mammogram): If you've ever had breasts, begin having regular mammograms starting at age 40. This is a scan to check for breast cancer.  Colon cancer screening: It is important to start screening for colon cancer at age 45.  Have a colonoscopy test every 10 years (or more often if you're at risk) Or, ask your provider about stool tests like a FIT test every year or Cologuard test every 3 years.  To learn more about your testing options, visit:   .  For help making a decision, visit:   https://bit.ly/jb23531.  Prostate cancer screening test: If you have a prostate, ask your care team if a prostate cancer screening test (PSA) at age 55 is right for you.  Lung cancer screening: If you are a current or former smoker ages 50 to 80, ask your care team if ongoing lung cancer screenings are right for you.  For informational purposes only. Not to replace the advice of your health care provider. Copyright   2023 [facility]. All rights reserved. Clinically reviewed by the [facility]. Pr[employer] 162569 - REV 01/24.  Learning About Stress  What is stress?     Stress is your body's response to a hard situation. Your body can have a physical, emotional, or mental response. Stress is a fact of life for most people, and it  affects everyone differently. What causes stress for you may not be stressful for someone else.  A lot of things can cause stress. You may feel stress when you go on a job interview, take a test, or run a race. This kind of short-term stress is normal and even useful. It can help you if you need to work hard or react quickly. For example, stress can help you finish an important job on time.  Long-term stress is caused by ongoing stressful situations or events. Examples of long-term stress include long-term health problems, ongoing problems at work, or conflicts in your family. Long-term stress can harm your health.  How does stress affect your health?  When you are stressed, your body responds as though you are in danger. It makes hormones that speed up your heart, make you breathe faster, and give you a burst of energy. This is called the fight-or-flight stress response. If the stress is over quickly, your body goes back to normal and no harm is done.  But if stress happens too often or lasts too long, it can have bad effects. Long-term stress can make you more likely to get sick, and it can make symptoms of some diseases worse. If you tense up when you are stressed, you may develop neck, shoulder, or low back pain. Stress is linked to high blood pressure and heart disease.  Stress also harms your emotional health. It can make you scott, tense, or depressed. Your relationships may suffer, and you may not do well at work or school.  What can you do to manage stress?  You can try these things to help manage stress:   Do something active. Exercise or activity can help reduce stress. Walking is a great way to get started. Even everyday activities such as housecleaning or yard work can help.  Try yoga or tonia chi. These techniques combine exercise and meditation. You may need some training at first to learn them.  Do something you enjoy. For example, listen to music or go to a movie. Practice your hobby or do volunteer  "work.  Meditate. This can help you relax, because you are not worrying about what happened before or what may happen in the future.  Do guided imagery. Imagine yourself in any setting that helps you feel calm. You can use online videos, books, or a teacher to guide you.  Do breathing exercises. For example:  From a standing position, bend forward from the waist with your knees slightly bent. Let your arms dangle close to the floor.  Breathe in slowly and deeply as you return to a standing position. Roll up slowly and lift your head last.  Hold your breath for just a few seconds in the standing position.  Breathe out slowly and bend forward from the waist.  Let your feelings out. Talk, laugh, cry, and express anger when you need to. Talking with supportive friends or family, a counselor, or a cyndi leader about your feelings is a healthy way to relieve stress. Avoid discussing your feelings with people who make you feel worse.  Write. It may help to write about things that are bothering you. This helps you find out how much stress you feel and what is causing it. When you know this, you can find better ways to cope.  What can you do to prevent stress?  You might try some of these things to help prevent stress:  Manage your time. This helps you find time to do the things you want and need to do.  Get enough sleep. Your body recovers from the stresses of the day while you are sleeping.  Get support. Your family, friends, and community can make a difference in how you experience stress.  Limit your news feed. Avoid or limit time on social media or news that may make you feel stressed.  Do something active. Exercise or activity can help reduce stress. Walking is a great way to get started.  Where can you learn more?  Go to https://www.[employer].net/patiented  Enter N032 in the search box to learn more about \"Learning About Stress.\"  Current as of: October 24, 2023  Content Version: 14.2 2024 [employer]. "   Care instructions adapted under license by your healthcare professional. If you have questions about a medical condition or this instruction, always ask your healthcare professional. [employer] disclaims any warranty or liability for your use of this information.

## 2024-10-31 LAB
C TRACH DNA SPEC QL PROBE+SIG AMP: NEGATIVE
HIV 1+2 AB+HIV1 P24 AG SERPL QL IA: NONREACTIVE
N GONORRHOEA DNA SPEC QL NAA+PROBE: NEGATIVE

## 2024-11-04 LAB
HPV HR 12 DNA CVX QL NAA+PROBE: POSITIVE
HPV16 DNA CVX QL NAA+PROBE: NEGATIVE
HPV18 DNA CVX QL NAA+PROBE: NEGATIVE
HUMAN PAPILLOMA VIRUS FINAL DIAGNOSIS: ABNORMAL

## 2024-11-06 ENCOUNTER — MYC MEDICAL ADVICE (OUTPATIENT)
Dept: FAMILY MEDICINE | Facility: CLINIC | Age: 34
End: 2024-11-06
Payer: COMMERCIAL

## 2024-11-06 LAB
BKR AP ASSOCIATED HPV REPORT: ABNORMAL
BKR LAB AP GYN ADEQUACY: ABNORMAL
BKR LAB AP GYN INTERPRETATION: ABNORMAL
BKR LAB AP PREVIOUS ABNORMAL: ABNORMAL
PATH REPORT.COMMENTS IMP SPEC: ABNORMAL
PATH REPORT.COMMENTS IMP SPEC: ABNORMAL
PATH REPORT.RELEVANT HX SPEC: ABNORMAL

## 2024-11-07 ENCOUNTER — PATIENT OUTREACH (OUTPATIENT)
Dept: FAMILY MEDICINE | Facility: CLINIC | Age: 34
End: 2024-11-07
Payer: COMMERCIAL

## 2024-11-07 PROBLEM — R87.810 CERVICAL HIGH RISK HPV (HUMAN PAPILLOMAVIRUS) TEST POSITIVE: Status: ACTIVE | Noted: 2024-10-30

## 2024-11-07 PROBLEM — R87.610 ASCUS WITH POSITIVE HIGH RISK HPV CERVICAL: Status: ACTIVE | Noted: 2024-10-30

## 2024-11-07 PROBLEM — R87.810 ASCUS WITH POSITIVE HIGH RISK HPV CERVICAL: Status: ACTIVE | Noted: 2024-10-30

## 2024-12-30 DIAGNOSIS — E06.3 HASHIMOTO'S DISEASE: ICD-10-CM

## 2024-12-30 RX ORDER — LEVOTHYROXINE SODIUM 125 UG/1
125 TABLET ORAL DAILY
Qty: 90 TABLET | Refills: 2 | Status: SHIPPED | OUTPATIENT
Start: 2024-12-30

## 2025-01-21 ENCOUNTER — OFFICE VISIT (OUTPATIENT)
Dept: OBGYN | Facility: CLINIC | Age: 35
End: 2025-01-21
Payer: COMMERCIAL

## 2025-01-21 VITALS
BODY MASS INDEX: 22.15 KG/M2 | OXYGEN SATURATION: 98 % | WEIGHT: 132.1 LBS | HEART RATE: 61 BPM | TEMPERATURE: 97.3 F | SYSTOLIC BLOOD PRESSURE: 103 MMHG | DIASTOLIC BLOOD PRESSURE: 59 MMHG

## 2025-01-21 DIAGNOSIS — R87.610 ASCUS WITH POSITIVE HIGH RISK HPV CERVICAL: Primary | ICD-10-CM

## 2025-01-21 DIAGNOSIS — R87.810 ASCUS WITH POSITIVE HIGH RISK HPV CERVICAL: Primary | ICD-10-CM

## 2025-01-21 LAB — HCG UR QL: NEGATIVE

## 2025-01-21 PROCEDURE — 81025 URINE PREGNANCY TEST: CPT | Performed by: OBSTETRICS & GYNECOLOGY

## 2025-01-21 PROCEDURE — 57455 BIOPSY OF CERVIX W/SCOPE: CPT | Performed by: OBSTETRICS & GYNECOLOGY

## 2025-01-21 NOTE — PATIENT INSTRUCTIONS

## 2025-01-21 NOTE — PROGRESS NOTES
Thelma Escobar is a 34 year old female No obstetric history on file. who presents for initial colposcopy, referred by Kwesi Brush DO. Pap smear on 10/30/2024 showed: ASCUS/ HR other HPV +. The prior pap showed normal.       No LMP recorded.  UPT today is negative  Patient does not smoke  Type of contraception: none  Age at first sexual intercourse: 19  Number of sexual partners (lifetime): 6  Past GYN history: HPV  Prior cervical/vaginal disease: none.  Prior cervical treatment: na.      PROCEDURE:      Before the procedure, it was ensured that the patient was educated regarding the nature of her findings to date, the implications, and what was to be done. She has been made aware of the role of HPV, the natural history of infection, ways to minimize her future risk, the effect of HPV on the cervix, and treatment options available should they be indicated. The details of the colposcopic procedure were reviewed. All questions were answered before proceeding, and informed consent was therefore obtained.      Speculum placed in vagina and excellent visualization of cervix acheived, cervix swabbed x 3 with acetic acid solution.      FINDINGS:  Cervix: acetowhitening noted from 4-9:00.  Dense plaque at 5 and 9:00, biopsies taken  Please refer to images section for details.  SCJ seen?: yes   ECC done?: No  Satisfactory examination?: yes      ASSESSMENT: HPV related changes.  PLAN: specimens labelled and sent to Pathology, will base further treatment on Pathology findings, and post biopsy instructions given to patient      Rosemarie Powers MD

## 2025-01-24 PROBLEM — R87.810 ASCUS WITH POSITIVE HIGH RISK HPV CERVICAL: Status: ACTIVE | Noted: 2024-10-30

## 2025-01-24 PROBLEM — R87.610 ASCUS WITH POSITIVE HIGH RISK HPV CERVICAL: Status: ACTIVE | Noted: 2024-10-30

## 2025-05-16 ENCOUNTER — RESULTS FOLLOW-UP (OUTPATIENT)
Dept: FAMILY MEDICINE | Facility: CLINIC | Age: 35
End: 2025-05-16

## 2025-05-16 DIAGNOSIS — N76.0 BV (BACTERIAL VAGINOSIS): Primary | ICD-10-CM

## 2025-05-16 DIAGNOSIS — B96.89 BV (BACTERIAL VAGINOSIS): Primary | ICD-10-CM

## 2025-05-16 RX ORDER — METRONIDAZOLE 500 MG/1
500 TABLET ORAL 2 TIMES DAILY
Qty: 14 TABLET | Refills: 0 | Status: SHIPPED | OUTPATIENT
Start: 2025-05-16 | End: 2025-05-23

## 2025-05-16 NOTE — TELEPHONE ENCOUNTER
Gladys--- please review pt's mychart response to your comment on recent lab results and advise.    Rashel Cordova, BSN, PHN, RN-[facility]

## 2025-05-19 ENCOUNTER — PATIENT OUTREACH (OUTPATIENT)
Dept: CARE COORDINATION | Facility: CLINIC | Age: 35
End: 2025-05-19
Payer: COMMERCIAL

## 2025-05-21 ENCOUNTER — PATIENT OUTREACH (OUTPATIENT)
Dept: CARE COORDINATION | Facility: CLINIC | Age: 35
End: 2025-05-21
Payer: COMMERCIAL

## 2025-06-18 ENCOUNTER — RESULTS FOLLOW-UP (OUTPATIENT)
Dept: FAMILY MEDICINE | Facility: CLINIC | Age: 35
End: 2025-06-18

## 2025-06-18 DIAGNOSIS — B37.31 YEAST INFECTION OF THE VAGINA: Primary | ICD-10-CM

## 2025-06-18 RX ORDER — FLUCONAZOLE 150 MG/1
150 TABLET ORAL ONCE
Qty: 1 TABLET | Refills: 0 | Status: SHIPPED | OUTPATIENT
Start: 2025-06-18 | End: 2025-06-18

## 2025-07-16 ENCOUNTER — PATIENT OUTREACH (OUTPATIENT)
Dept: CARE COORDINATION | Facility: CLINIC | Age: 35
End: 2025-07-16
Payer: COMMERCIAL